# Patient Record
(demographics unavailable — no encounter records)

---

## 2025-01-17 NOTE — ASSESSMENT
[FreeTextEntry1] : 58F with acute R knee pain, calcific tendinitis MCL   nature of dx discussed R knee CSI today, tolerated well d/c knee immobilizer Ice frequently ankle pumps cane as reviewed return 10 days for re-evaluation

## 2025-01-17 NOTE — DATA REVIEWED
[FreeTextEntry1] : [place] 1/16/25 R knee: Impression: Large calcific density MCL, minimal degenerative changes.  Doppler U/S RLE Impression: neg for DVT       SHAWNA DHILLON DO; Attending Radiologist This document has been electronically signed. Jan 16 2025 12:04PM Notes Patient History (4)

## 2025-01-17 NOTE — PHYSICAL EXAM
[Right] : right knee [NL (0)] : extension 0 degrees [] : no lateral joint line tenderness [FreeTextEntry9] : guards to active and passive flexion 90 [de-identified] : knee immobilizer

## 2025-01-17 NOTE — HISTORY OF PRESENT ILLNESS
[Dull/Aching] : dull/aching [Localized] : localized [Tightness] : tightness [de-identified] : 1/17/2025:  59 y/o F with acute RIGHT knee pain since Tuesday.  [place] pain and knee stiffness, progressively worse. Taking Advil/Tylenol helps some.  When acute pain worsened and she could not bend her leg, she went to [facility] ER.  xrays right knee and doppler u/s were done.  No prior knee issues.    PMHx: Breast CA, diagnosed 3 weeks ago. [] : no [FreeTextEntry1] : RT knee  [FreeTextEntry5] : RT knee pain developed a few days ago. Had went to hospital [facility].

## 2025-01-17 NOTE — PROCEDURE
[Large Joint Injection] : Large joint injection [Right] : of the right [Knee] : knee [Pain] : pain [Inflammation] : inflammation [Alcohol] : alcohol [Betadine] : betadine [Ethyl Chloride sprayed topically] : ethyl chloride sprayed topically [Sterile technique used] : sterile technique used [___ cc    6mg] :  Betamethasone (Celestone) ~Vcc of 6mg [___ cc    1%] : Lidocaine ~Vcc of 1%  [] : Patient tolerated procedure well [Call if redness, pain or fever occur] : call if redness, pain or fever occur [Apply ice for 15min out of every hour for the next 12-24 hours as tolerated] : apply ice for 15 minutes out of every hour for the next 12-24 hours as tolerated [Previous OTC use and PT nontherapeutic] : patient has tried OTC's including aspirin, Ibuprofen, Aleve, etc or prescription NSAIDS, and/or exercises at home and/or physical therapy without satisfactory response [Patient had decreased mobility in the joint] : patient had decreased mobility in the joint [Risks, benefits, alternatives discussed / Verbal consent obtained] : the risks benefits, and alternatives have been discussed, and verbal consent was obtained

## 2025-01-28 NOTE — HISTORY OF PRESENT ILLNESS
[Dull/Aching] : dull/aching [Localized] : localized [Tightness] : tightness [de-identified] : 1/28/2025: here for follow up right knee pain.  Medial knee pain and stiffness persists.  She is able to bend knee better.  There is a pulling sensation. CSI was helpful. She is trying to ambulate more, no longer using cane.  Pain went from 10/10 to 1/10.  Pressure and throbbing qhs area of calcification.  1/17/2025:  57 y/o F with acute RIGHT knee pain since Tuesday.  [place] pain and knee stiffness, progressively worse. Taking Advil/Tylenol helps some.  When acute pain worsened and she could not bend her leg, she went to [facility] ER.  xrays right knee and doppler u/s were done.  No prior knee issues.    PMHx: Breast CA, diagnosed 3 weeks ago. [] : no [FreeTextEntry1] : RT knee  [FreeTextEntry5] : RT knee pain developed a few days ago. Had went to hospital [facility].

## 2025-01-28 NOTE — PHYSICAL EXAM
[] : patient ambulates with assistive device [FreeTextEntry8] : improved, some soreness medially [FreeTextEntry9] : mild medial pain with flexion and extension [TWNoteComboBox7] : flexion 120 degrees